# Patient Record
Sex: FEMALE | Race: OTHER | Employment: FULL TIME | URBAN - METROPOLITAN AREA
[De-identification: names, ages, dates, MRNs, and addresses within clinical notes are randomized per-mention and may not be internally consistent; named-entity substitution may affect disease eponyms.]

---

## 2018-11-28 ENCOUNTER — OFFICE VISIT (OUTPATIENT)
Dept: INTERNAL MEDICINE CLINIC | Facility: CLINIC | Age: 28
End: 2018-11-28
Payer: COMMERCIAL

## 2018-11-28 VITALS
BODY MASS INDEX: 20.89 KG/M2 | SYSTOLIC BLOOD PRESSURE: 126 MMHG | DIASTOLIC BLOOD PRESSURE: 78 MMHG | HEART RATE: 81 BPM | WEIGHT: 130 LBS | HEIGHT: 66 IN | OXYGEN SATURATION: 100 % | RESPIRATION RATE: 15 BRPM

## 2018-11-28 DIAGNOSIS — Z00.00 PHYSICAL EXAM: Primary | ICD-10-CM

## 2018-11-28 PROCEDURE — 99385 PREV VISIT NEW AGE 18-39: CPT | Performed by: FAMILY MEDICINE

## 2018-11-28 NOTE — PROGRESS NOTES
HPI:   Bonnie Khan is a 25year old female who presents for a complete physical exam.  Last pap:   Had 1/18 normal  With OB at her 6 wk pp check   Menses:  Regular   Contraception:  Condoms   Supplements:  no  Exercise:  No   Patient complains/concerns: without murmur  GI: good BS's,no masses, HSM or tenderness  EXTREMITIES: no edema    ASSESSMENT AND PLAN:   Noe Galaviz is a 25year old female who presents for a complete physical exam.  No diagnosis found.   · Pap:   ·   Self breast exam explained and

## 2018-11-29 ENCOUNTER — TELEPHONE (OUTPATIENT)
Dept: INTERNAL MEDICINE CLINIC | Facility: CLINIC | Age: 28
End: 2018-11-29

## 2018-12-04 ENCOUNTER — TELEPHONE (OUTPATIENT)
Dept: INTERNAL MEDICINE CLINIC | Facility: CLINIC | Age: 28
End: 2018-12-04

## 2018-12-04 NOTE — TELEPHONE ENCOUNTER
Medical release form was fax to [facility].  Teresa Adkins or Dr. Cathi Osgood 100   Ph. [phone] and sent to scan

## 2019-07-18 NOTE — TELEPHONE ENCOUNTER
Medical release form was fax to [facility] [address]. Beatriz Thomas or Dr. Maurice Hernandez    @ fax [phone].  **3rd Request**

## 2019-10-10 ENCOUNTER — OFFICE VISIT (OUTPATIENT)
Dept: FAMILY MEDICINE CLINIC | Facility: CLINIC | Age: 29
End: 2019-10-10
Payer: COMMERCIAL

## 2019-10-10 VITALS
RESPIRATION RATE: 17 BRPM | HEIGHT: 66 IN | WEIGHT: 161 LBS | OXYGEN SATURATION: 99 % | TEMPERATURE: 98 F | HEART RATE: 77 BPM | DIASTOLIC BLOOD PRESSURE: 70 MMHG | BODY MASS INDEX: 25.88 KG/M2 | SYSTOLIC BLOOD PRESSURE: 122 MMHG

## 2019-10-10 DIAGNOSIS — H61.23 BILATERAL IMPACTED CERUMEN: Primary | ICD-10-CM

## 2019-10-10 PROCEDURE — 69209 REMOVE IMPACTED EAR WAX UNI: CPT | Performed by: NURSE PRACTITIONER

## 2019-10-10 NOTE — PROGRESS NOTES
CHIEF COMPLAINT:   Patient presents with:  Ear Problem: trouble hearing from right ear x 2 wks       HPI:   Elan Glynn is a 30year old female who presents to clinic today with complaints of clogged ears. Patient requesting ear flush.  Patient report Bilateral impacted cerumen  (primary encounter diagnosis)    PLAN:  Successful cerumen removal. Patient tolerated well without complications.   Comfort measures/home care as described in Patient Instructions    Stop using q-tips    Discussed s/s of infectio Objects repeatedly placed in the ear can also cause impacted earwax. For example, putting cotton swabs in the ear may push the wax deeper into the ear. Over time, this may cause blockage.  Hearing aids, swimming plugs, and swim molds can cause the same prob Call your healthcare provider if you have symptoms of impacted earwax. Also call right away if you have severe symptoms after earwax removal. These may include bleeding or severe ear pain.   Date Last Reviewed: 5/1/2017  © 1951-6635 [employer] · The healthcare provider may recommend mineral oil or an over-the-counter eardrop to use at home to soften the earwax. Use these products only if the provider recommends them. Carefully follow the instructions given.   · Don’t use mineral oil or OTC eardro · Foul-smelling fluid draining from the ear  · Swelling, redness, or tenderness of the outer ear  · Headache, neck pain, or stiff neck  Date Last Reviewed: 6/1/2017  © 9921-0867 [employer] [address]. [address] [facility] [place], [address] [place] [address].  [facility]

## 2019-10-10 NOTE — PATIENT INSTRUCTIONS
Impacted Earwax     Inner ear structures including ear canal and eardrum. Impacted earwax is a buildup of the natural wax in the ear (cerumen). Impacted earwax is very common. It can cause symptoms such as hearing loss.  It can also make it difficult buildup.  Then it may cause symptoms such as:  · Hearing loss  · Earache  · Sense of ear fullness  · Itching in the ear  · Odor from the ear  · Ear drainage  · Dizziness  · Ringing in the ears  · Cough  Treatment for impacted earwax  If you don’t have sympt instructions. Earwax Removal    The ear canal makes earwax from the canal’s lining. The ears make wax to lubricate and protect the ear canal. The ear canal is the tube that connects the middle ear to the outside of the ear.  The wax protects the ear of the ear and around the opening to the ear canal.  · Don't use any probing device or object such as cotton-tipped swabs or leyda pins to clean the inside of your ears. · Don’t use ear candles to clean your ears. Candling can be dangerous.  It can burn th

## 2019-12-05 ENCOUNTER — OFFICE VISIT (OUTPATIENT)
Dept: INTERNAL MEDICINE CLINIC | Facility: CLINIC | Age: 29
End: 2019-12-05
Payer: COMMERCIAL

## 2019-12-05 VITALS
BODY MASS INDEX: 25.01 KG/M2 | HEIGHT: 66 IN | HEART RATE: 67 BPM | OXYGEN SATURATION: 100 % | DIASTOLIC BLOOD PRESSURE: 61 MMHG | WEIGHT: 155.63 LBS | SYSTOLIC BLOOD PRESSURE: 122 MMHG

## 2019-12-05 DIAGNOSIS — Z00.00 PHYSICAL EXAM: Primary | ICD-10-CM

## 2019-12-05 PROCEDURE — 99395 PREV VISIT EST AGE 18-39: CPT | Performed by: FAMILY MEDICINE

## 2019-12-05 NOTE — PROGRESS NOTES
HPI:   Pauline Jason is a 30year old female who presents for a complete physical exam.  Last pap: ?  Few yearsat Ob GYn  Menses:  Regular , q 32   Contraception:  Condoms   Supplements:  MVI  Exercise:  No     [facility]  LUNGS: clear to auscultation  CARDIO: RRR without murmur  GI: good BS's,no masses, HSM or tenderness  : normal external genitalia, normal appearing cervix, no adnexal masses.    On period  EXTREMITIES: no edema    ASSESSMENT AND PLAN:   Mckay Cellar

## 2020-12-09 ENCOUNTER — OFFICE VISIT (OUTPATIENT)
Dept: INTERNAL MEDICINE CLINIC | Facility: CLINIC | Age: 30
End: 2020-12-09
Payer: COMMERCIAL

## 2020-12-09 VITALS
BODY MASS INDEX: 25.94 KG/M2 | OXYGEN SATURATION: 98 % | SYSTOLIC BLOOD PRESSURE: 114 MMHG | HEIGHT: 66 IN | WEIGHT: 161.38 LBS | DIASTOLIC BLOOD PRESSURE: 64 MMHG | HEART RATE: 91 BPM

## 2020-12-09 DIAGNOSIS — Z00.00 PHYSICAL EXAM: Primary | ICD-10-CM

## 2020-12-09 PROCEDURE — 3074F SYST BP LT 130 MM HG: CPT | Performed by: FAMILY MEDICINE

## 2020-12-09 PROCEDURE — 99395 PREV VISIT EST AGE 18-39: CPT | Performed by: FAMILY MEDICINE

## 2020-12-09 PROCEDURE — 90686 IIV4 VACC NO PRSV 0.5 ML IM: CPT | Performed by: FAMILY MEDICINE

## 2020-12-09 PROCEDURE — 90471 IMMUNIZATION ADMIN: CPT | Performed by: FAMILY MEDICINE

## 2020-12-09 PROCEDURE — 3078F DIAST BP <80 MM HG: CPT | Performed by: FAMILY MEDICINE

## 2020-12-09 PROCEDURE — 3008F BODY MASS INDEX DOCD: CPT | Performed by: FAMILY MEDICINE

## 2020-12-09 NOTE — PROGRESS NOTES
HPI:   Porfirio Wheatley is a 26year old female who presents for a complete physical exam.  Last pap: UTD  Menses:  Regular , last one a little heavier than normal, 4-5/ days and come q 30   Contraception:  Condoms   Supplements:  MVI  Exercise:  Not regular supple,no adenopathy  BREAST: no dominant or suspicious mass, no nipple discharge  LUNGS: clear to auscultation  CARDIO: RRR without murmur  GI: good BS's,no masses, HSM or tenderness  EXTREMITIES: no edema    ASSESSMENT AND PLAN:   Guille Galicia is a 26

## 2021-02-06 ENCOUNTER — NURSE ONLY (OUTPATIENT)
Dept: INTERNAL MEDICINE CLINIC | Facility: CLINIC | Age: 31
End: 2021-02-06
Payer: COMMERCIAL

## 2021-02-06 PROCEDURE — 36415 COLL VENOUS BLD VENIPUNCTURE: CPT | Performed by: FAMILY MEDICINE

## 2021-02-07 LAB
ABSOLUTE BASOPHILS: 20 CELLS/UL (ref 0–200)
ABSOLUTE EOSINOPHILS: 20 CELLS/UL (ref 15–500)
ABSOLUTE LYMPHOCYTES: 1469 CELLS/UL (ref 850–3900)
ABSOLUTE MONOCYTES: 474 CELLS/UL (ref 200–950)
ABSOLUTE NEUTROPHILS: 3116 CELLS/UL (ref 1500–7800)
ALBUMIN/GLOBULIN RATIO: 1.4 (CALC) (ref 1–2.5)
ALBUMIN: 4.3 G/DL (ref 3.6–5.1)
ALKALINE PHOSPHATASE: 55 U/L (ref 31–125)
ALT: 8 U/L (ref 6–29)
AST: 13 U/L (ref 10–30)
BASOPHILS: 0.4 %
BILIRUBIN, TOTAL: 0.8 MG/DL (ref 0.2–1.2)
BUN: 11 MG/DL (ref 7–25)
CALCIUM: 9.5 MG/DL (ref 8.6–10.2)
CARBON DIOXIDE: 27 MMOL/L (ref 20–32)
CHLORIDE: 104 MMOL/L (ref 98–110)
CHOL/HDLC RATIO: 2.8 (CALC)
CHOLESTEROL, TOTAL: 173 MG/DL
CREATININE: 0.83 MG/DL (ref 0.5–1.1)
EGFR IF AFRICN AM: 107 ML/MIN/1.73M2
EGFR IF NONAFRICN AM: 93 ML/MIN/1.73M2
EOSINOPHILS: 0.4 %
GLOBULIN: 3.1 G/DL (CALC) (ref 1.9–3.7)
GLUCOSE: 89 MG/DL (ref 65–99)
HDL CHOLESTEROL: 62 MG/DL
HEMATOCRIT: 36.2 % (ref 35–45)
HEMOGLOBIN: 12.1 G/DL (ref 11.7–15.5)
LDL-CHOLESTEROL: 93 MG/DL (CALC)
LYMPHOCYTES: 28.8 %
MCH: 30.2 PG (ref 27–33)
MCHC: 33.4 G/DL (ref 32–36)
MCV: 90.3 FL (ref 80–100)
MONOCYTES: 9.3 %
MPV: 9.9 FL (ref 7.5–12.5)
NEUTROPHILS: 61.1 %
NON-HDL CHOLESTEROL: 111 MG/DL (CALC)
PLATELET COUNT: 273 THOUSAND/UL (ref 140–400)
POTASSIUM: 4.2 MMOL/L (ref 3.5–5.3)
PROTEIN, TOTAL: 7.4 G/DL (ref 6.1–8.1)
RDW: 11.5 % (ref 11–15)
RED BLOOD CELL COUNT: 4.01 MILLION/UL (ref 3.8–5.1)
SODIUM: 138 MMOL/L (ref 135–146)
TRIGLYCERIDES: 86 MG/DL
TSH W/REFLEX TO FT4: 1.89 MIU/L
WHITE BLOOD CELL COUNT: 5.1 THOUSAND/UL (ref 3.8–10.8)

## 2021-12-16 ENCOUNTER — OFFICE VISIT (OUTPATIENT)
Dept: INTERNAL MEDICINE CLINIC | Facility: CLINIC | Age: 31
End: 2021-12-16
Payer: COMMERCIAL

## 2021-12-16 VITALS
WEIGHT: 159.63 LBS | OXYGEN SATURATION: 100 % | BODY MASS INDEX: 25.66 KG/M2 | DIASTOLIC BLOOD PRESSURE: 66 MMHG | HEART RATE: 98 BPM | SYSTOLIC BLOOD PRESSURE: 128 MMHG | TEMPERATURE: 98 F | HEIGHT: 66 IN

## 2021-12-16 DIAGNOSIS — Z00.00 PHYSICAL EXAM: Primary | ICD-10-CM

## 2021-12-16 DIAGNOSIS — Z3A.14 14 WEEKS GESTATION OF PREGNANCY: ICD-10-CM

## 2021-12-16 PROCEDURE — 3008F BODY MASS INDEX DOCD: CPT | Performed by: FAMILY MEDICINE

## 2021-12-16 PROCEDURE — 90686 IIV4 VACC NO PRSV 0.5 ML IM: CPT | Performed by: FAMILY MEDICINE

## 2021-12-16 PROCEDURE — 90471 IMMUNIZATION ADMIN: CPT | Performed by: FAMILY MEDICINE

## 2021-12-16 PROCEDURE — 99395 PREV VISIT EST AGE 18-39: CPT | Performed by: FAMILY MEDICINE

## 2021-12-16 PROCEDURE — 3074F SYST BP LT 130 MM HG: CPT | Performed by: FAMILY MEDICINE

## 2021-12-16 PROCEDURE — 3078F DIAST BP <80 MM HG: CPT | Performed by: FAMILY MEDICINE

## 2021-12-16 NOTE — PROGRESS NOTES
HPI:   Mona Small is a 33year old female who presents for a complete physical exam.  Last pap: UTD  Pregnant 14 weeks.  Doing well now  Taking PNV  Not on any meds  Exercise:  Not regular , working from home      [facility]  LUNGS: clear to auscultation  CARDIO: RRR without murmur  GI: good BS's,no masses, HSM or tenderness  EXTREMITIES: no edema    ASSESSMENT AND PLAN:   Sami Melendez is a 33year old female who presents for a complete physical exam.  Ph

## 2023-12-06 ENCOUNTER — OFFICE VISIT (OUTPATIENT)
Dept: INTERNAL MEDICINE CLINIC | Facility: CLINIC | Age: 33
End: 2023-12-06
Payer: COMMERCIAL

## 2023-12-06 VITALS
HEART RATE: 84 BPM | HEIGHT: 66 IN | DIASTOLIC BLOOD PRESSURE: 72 MMHG | SYSTOLIC BLOOD PRESSURE: 130 MMHG | OXYGEN SATURATION: 100 % | WEIGHT: 166.19 LBS | BODY MASS INDEX: 26.71 KG/M2

## 2023-12-06 DIAGNOSIS — R59.1 LYMPHADENOPATHY: ICD-10-CM

## 2023-12-06 DIAGNOSIS — Z00.00 PHYSICAL EXAM: Primary | ICD-10-CM

## 2023-12-06 PROCEDURE — 99395 PREV VISIT EST AGE 18-39: CPT | Performed by: FAMILY MEDICINE

## 2023-12-06 PROCEDURE — 3078F DIAST BP <80 MM HG: CPT | Performed by: FAMILY MEDICINE

## 2023-12-06 PROCEDURE — 3075F SYST BP GE 130 - 139MM HG: CPT | Performed by: FAMILY MEDICINE

## 2023-12-06 PROCEDURE — 3008F BODY MASS INDEX DOCD: CPT | Performed by: FAMILY MEDICINE

## 2023-12-08 LAB — HPV MRNA E6/E7: NOT DETECTED

## 2023-12-12 ENCOUNTER — PATIENT MESSAGE (OUTPATIENT)
Dept: INTERNAL MEDICINE CLINIC | Facility: CLINIC | Age: 33
End: 2023-12-12

## 2023-12-12 NOTE — TELEPHONE ENCOUNTER
From: Janneth Cody  To: Bia Paula  Sent: 12/12/2023 10:23 AM CST  Subject: Ultrasound    Hi, just checking if it would be fine to get the ultrasound done at an imaging facility vs the hospital?    Thanks

## 2023-12-14 ENCOUNTER — TELEPHONE (OUTPATIENT)
Dept: INTERNAL MEDICINE CLINIC | Facility: CLINIC | Age: 33
End: 2023-12-14

## 2023-12-14 NOTE — TELEPHONE ENCOUNTER
Patient called stating that her  will  a copy of her order for ct scan. His name is Ramesh Jones.  Copy of order is in filing drawer

## 2023-12-17 LAB
ABSOLUTE BASOPHILS: 40 CELLS/UL (ref 0–200)
ABSOLUTE EOSINOPHILS: 20 CELLS/UL (ref 15–500)
ABSOLUTE LYMPHOCYTES: 1695 CELLS/UL (ref 850–3900)
ABSOLUTE MONOCYTES: 415 CELLS/UL (ref 200–950)
ABSOLUTE NEUTROPHILS: 2830 CELLS/UL (ref 1500–7800)
ALBUMIN/GLOBULIN RATIO: 1.5 (CALC) (ref 1–2.5)
ALBUMIN: 4.5 G/DL (ref 3.6–5.1)
ALKALINE PHOSPHATASE: 54 U/L (ref 31–125)
ALT: 7 U/L (ref 6–29)
AST: 13 U/L (ref 10–30)
BASOPHILS: 0.8 %
BILIRUBIN, TOTAL: 0.6 MG/DL (ref 0.2–1.2)
BUN: 14 MG/DL (ref 7–25)
CALCIUM: 9.4 MG/DL (ref 8.6–10.2)
CARBON DIOXIDE: 27 MMOL/L (ref 20–32)
CHLORIDE: 105 MMOL/L (ref 98–110)
CHOL/HDLC RATIO: 2.7 (CALC)
CHOLESTEROL, TOTAL: 170 MG/DL
CREATININE: 0.69 MG/DL (ref 0.5–0.97)
EGFR: 116 ML/MIN/1.73M2
EOSINOPHILS: 0.4 %
GLOBULIN: 3 G/DL (CALC) (ref 1.9–3.7)
GLUCOSE: 94 MG/DL (ref 65–99)
HDL CHOLESTEROL: 64 MG/DL
HEMATOCRIT: 38.1 % (ref 35–45)
HEMOGLOBIN: 12.5 G/DL (ref 11.7–15.5)
LDL-CHOLESTEROL: 92 MG/DL (CALC)
LYMPHOCYTES: 33.9 %
MCH: 30.2 PG (ref 27–33)
MCHC: 32.8 G/DL (ref 32–36)
MCV: 92 FL (ref 80–100)
MONOCYTES: 8.3 %
MPV: 9.9 FL (ref 7.5–12.5)
NEUTROPHILS: 56.6 %
NON-HDL CHOLESTEROL: 106 MG/DL (CALC)
PLATELET COUNT: 277 THOUSAND/UL (ref 140–400)
POTASSIUM: 4.6 MMOL/L (ref 3.5–5.3)
PROTEIN, TOTAL: 7.5 G/DL (ref 6.1–8.1)
RDW: 11.7 % (ref 11–15)
RED BLOOD CELL COUNT: 4.14 MILLION/UL (ref 3.8–5.1)
SODIUM: 139 MMOL/L (ref 135–146)
TRIGLYCERIDES: 56 MG/DL
TSH W/REFLEX TO FT4: 1.67 MIU/L
WHITE BLOOD CELL COUNT: 5 THOUSAND/UL (ref 3.8–10.8)

## 2024-01-17 ENCOUNTER — TELEPHONE (OUTPATIENT)
Dept: INTERNAL MEDICINE CLINIC | Facility: CLINIC | Age: 34
End: 2024-01-17

## 2024-01-17 DIAGNOSIS — E04.1 THYROID NODULE: Primary | ICD-10-CM

## 2024-01-17 DIAGNOSIS — R59.1 LYMPHADENOPATHY: ICD-10-CM

## 2024-01-17 NOTE — TELEPHONE ENCOUNTER
Patient regarding ultrasound of her neck results that was done at [facility].  Ultrasound shows some nonspecific possible reactive lymph nodes - largest 2.2 cm and recommend 3-month follow-up.  Also partially visualized thyroid nodules were seen.  Thyroid ultrasound recommended.    I discussed results with patient.  Thyroid ultrasound ordered with notes to please look at the lymph nodes as well for follow up.  Pt thinks LNs always on larger side. Has had some cough/ colds recently past 2 mos    If ever worrisome symptoms, night sweats/ weight changes- contact me

## 2024-01-18 ENCOUNTER — MED REC SCAN ONLY (OUTPATIENT)
Dept: INTERNAL MEDICINE CLINIC | Facility: CLINIC | Age: 34
End: 2024-01-18

## 2024-04-11 PROBLEM — E04.1 THYROID NODULE: Status: ACTIVE | Noted: 2024-04-11

## 2024-10-22 NOTE — PROGRESS NOTES
HPI:   Melba Watson is a 34 year old female who presents for a complete physical exam  PAp UTD  Periods- regular ,   Not on ocps    Exercise:  no dedicated , but active with kids      Thyroid nodule    Some breast pain before her period - always left side and outside breast area  No fam breast cancer    Mild anxiety     2 boys-  Admin for [employer]        Wt Readings from Last 6 Encounters:   10/24/24 170 lb 3.2 oz (77.2 kg)   12/06/23 166 lb 3.2 oz (75.4 kg)   12/16/21 159 lb 9.6 oz (72.4 kg)   12/09/20 161 lb 6.4 oz (73.2 kg)   12/05/19 155 lb 9.6 oz (70.6 kg)   10/10/19 161 lb (73 kg)     Body mass index is 27.47 kg/m².     CHOLESTEROL, TOTAL (mg/dL)   Date Value   12/16/2023 170   02/06/2021 173   12/08/2018 182     HDL CHOLESTEROL (mg/dL)   Date Value   12/16/2023 64   02/06/2021 62   12/08/2018 65     LDL-CHOLESTEROL (mg/dL (calc))   Date Value   12/16/2023 92   02/06/2021 93   12/08/2018 100 (H)        No current outpatient medications on file.      No past medical history on file.   No past surgical history on file.   Family History   Problem Relation Age of Onset    Lipids Father       Social History:   Social History     Socioeconomic History    Marital status:    Tobacco Use    Smoking status: Never    Smokeless tobacco: Never   Substance and Sexual Activity    Alcohol use: No     Social Drivers of Health     Food Insecurity: Low Risk  (5/31/2022)    Received from [facility]    Food Insecurity     Have there been times that your food ran out, and you didn't have money to get more?: No     Are there times that you worry that this might happen?: No   Transportation Needs: Low Risk  (5/31/2022)    Received from [facility]    Transportation Needs     Do you have trouble getting transportation to medical appointments?: No   Housing Stability: Low Risk  (5/31/2022)    Received  from [facility], [facility]    Housing Stability     Are you concerned about having a safe and reliable place to live?: No          REVIEW OF SYSTEMS:   GENERAL: feels well otherwise  No night sweats/ coughs/ fevers/ weight loss   States has always had enlarged lymph nodes for years that are unchanged   SKIN: denies any unusual skin lesions  LUNGS: denies shortness of breath  CARDIOVASCULAR: denies chest pain  GI: denies abdominal pain,  No constipation or diarrhea        EXAM:   /80   Pulse 94   Ht 5' 6\" (1.676 m)   Wt 170 lb 3.2 oz (77.2 kg)   SpO2 98%   BMI 27.47 kg/m²   Body mass index is 27.47 kg/m².   GENERAL: well developed, well nourished,in no apparent distress  SKIN: no rashes,no suspicious lesions  HEENT: atraumatic, normocephalic,  EYES:conjunctiva are clear  NECK: supple, small mobile cervical LNs , no supraclavicular or axillary notes . No posterior cervical LNS  No thyroid nodules appreciated   BREAST: no dominant or suspicious mass, no nipple discharge  LUNGS: clear to auscultation  CARDIO: RRR without murmur  GI: good BS's,no masses, HSM or tenderness    EXTREMITIES: no edema    ASSESSMENT AND PLAN:   Melba Watson is a 34 year old female who presents for a complete physical exam.  Encounter Diagnosis   Name Primary?    Physical exam Yes         Discussed with patient pap smear guidelines and the importance of screening for cervical cancer  Discussed the importance of yearly mammograms starting at age 40 to help detect breast cancer.   Self breast exam explained and encouraged to perform monthly.   Health maintenance labs, recommend yearly: Lipids, CMP, and CBC.   Discussed importance of screening for colon cancer with colonoscopies starting at age 45, or sooner if high risk  Recommended low fat diet, low carb diet and regular aerobic exercise.    The patient indicates understanding of these issues and agrees to the plan.  The patient is asked  to return for CPX in 1 yr.    1. Physical exam    - HGB A1C (Glycohemoglobin) [496] [Q]  - CBC With Differential With Platelet  - Comp Metabolic Panel (14)  - TSH W Reflex To Free T4  - Lipid Panel    2. Thyroid nodule  F/u 1 year   - US THYROID (CPT=76536); Future    3. Breast pain  No masses on exam but recommend diagnostic mammo   - MERRY ROZ 2D+3D DIAGNOSTIC MERRY  BILAT (CPT=77066/75849); Future            5. Cervical lymphadenopathy  Normal appearing LNS seen on last US  LNs get larger when sick   If ever increasing, painful, fevers, other symptoms follow up    - US THYROID (CPT=76536); Future      Orders Placed This Encounter   Procedures    HGB A1C (Glycohemoglobin) [496] [Q]    CBC With Differential With Platelet    Comp Metabolic Panel (14)    TSH W Reflex To Free T4    Lipid Panel       Meds & Refills for this Visit:  Requested Prescriptions      No prescriptions requested or ordered in this encounter       Imaging & Consults:  None

## 2024-10-24 ENCOUNTER — OFFICE VISIT (OUTPATIENT)
Dept: INTERNAL MEDICINE CLINIC | Facility: CLINIC | Age: 34
End: 2024-10-24
Payer: COMMERCIAL

## 2024-10-24 ENCOUNTER — TELEPHONE (OUTPATIENT)
Dept: INTERNAL MEDICINE CLINIC | Facility: CLINIC | Age: 34
End: 2024-10-24

## 2024-10-24 VITALS
BODY MASS INDEX: 27.35 KG/M2 | SYSTOLIC BLOOD PRESSURE: 138 MMHG | OXYGEN SATURATION: 98 % | HEART RATE: 94 BPM | HEIGHT: 66 IN | DIASTOLIC BLOOD PRESSURE: 70 MMHG | WEIGHT: 170.19 LBS

## 2024-10-24 DIAGNOSIS — E04.1 THYROID NODULE: ICD-10-CM

## 2024-10-24 DIAGNOSIS — R59.0 CERVICAL LYMPHADENOPATHY: ICD-10-CM

## 2024-10-24 DIAGNOSIS — N64.4 BREAST PAIN: ICD-10-CM

## 2024-10-24 DIAGNOSIS — Z00.00 PHYSICAL EXAM: Primary | ICD-10-CM

## 2024-10-24 PROCEDURE — 99395 PREV VISIT EST AGE 18-39: CPT | Performed by: FAMILY MEDICINE

## 2024-10-24 NOTE — TELEPHONE ENCOUNTER
Called patient and informed mammo orders are ready to be picked up.   Patient stated either her or her  will  mammo orders today or tomorrow.   EDGARD BOSWELL

## 2024-10-24 NOTE — TELEPHONE ENCOUNTER
Patient called the office as she forgot at her appointment today to please ask for an order for her mammogram, as she may be going to an outside facility for the test.    She said either she or her  will  the envelope today.

## 2024-10-25 LAB
ABSOLUTE BASOPHILS: 27 CELLS/UL (ref 0–200)
ABSOLUTE EOSINOPHILS: 20 CELLS/UL (ref 15–500)
ABSOLUTE LYMPHOCYTES: 1850 CELLS/UL (ref 850–3900)
ABSOLUTE MONOCYTES: 381 CELLS/UL (ref 200–950)
ABSOLUTE NEUTROPHILS: 4522 CELLS/UL (ref 1500–7800)
ALBUMIN/GLOBULIN RATIO: 1.4 (CALC) (ref 1–2.5)
ALBUMIN: 4.8 G/DL (ref 3.6–5.1)
ALKALINE PHOSPHATASE: 60 U/L (ref 31–125)
ALT: 8 U/L (ref 6–29)
AST: 17 U/L (ref 10–30)
BASOPHILS: 0.4 %
BILIRUBIN, TOTAL: 0.5 MG/DL (ref 0.2–1.2)
BUN: 12 MG/DL (ref 7–25)
CALCIUM: 10 MG/DL (ref 8.6–10.2)
CARBON DIOXIDE: 19 MMOL/L (ref 20–32)
CHLORIDE: 107 MMOL/L (ref 98–110)
CHOL/HDLC RATIO: 3 (CALC)
CHOLESTEROL, TOTAL: 194 MG/DL
CREATININE: 0.71 MG/DL (ref 0.5–0.97)
EGFR: 113 ML/MIN/1.73M2
EOSINOPHILS: 0.3 %
GLOBULIN: 3.5 G/DL (CALC) (ref 1.9–3.7)
GLUCOSE: 103 MG/DL (ref 65–99)
HDL CHOLESTEROL: 65 MG/DL
HEMATOCRIT: 40.7 % (ref 35–45)
HEMOGLOBIN A1C: 5.3 % OF TOTAL HGB
HEMOGLOBIN: 13.5 G/DL (ref 11.7–15.5)
LDL-CHOLESTEROL: 105 MG/DL (CALC)
LYMPHOCYTES: 27.2 %
MCH: 30.3 PG (ref 27–33)
MCHC: 33.2 G/DL (ref 32–36)
MCV: 91.3 FL (ref 80–100)
MONOCYTES: 5.6 %
MPV: 9.8 FL (ref 7.5–12.5)
NEUTROPHILS: 66.5 %
NON-HDL CHOLESTEROL: 129 MG/DL (CALC)
PLATELET COUNT: 320 THOUSAND/UL (ref 140–400)
POTASSIUM: 4.8 MMOL/L (ref 3.5–5.3)
PROTEIN, TOTAL: 8.3 G/DL (ref 6.1–8.1)
RDW: 11.4 % (ref 11–15)
RED BLOOD CELL COUNT: 4.46 MILLION/UL (ref 3.8–5.1)
SODIUM: 140 MMOL/L (ref 135–146)
TRIGLYCERIDES: 147 MG/DL
TSH W/REFLEX TO FT4: 3.14 MIU/L
WHITE BLOOD CELL COUNT: 6.8 THOUSAND/UL (ref 3.8–10.8)

## 2024-10-26 ENCOUNTER — TELEPHONE (OUTPATIENT)
Dept: INTERNAL MEDICINE CLINIC | Facility: CLINIC | Age: 34
End: 2024-10-26

## 2025-02-14 ENCOUNTER — MED REC SCAN ONLY (OUTPATIENT)
Dept: INTERNAL MEDICINE CLINIC | Facility: CLINIC | Age: 35
End: 2025-02-14

## 2025-07-06 ENCOUNTER — PATIENT MESSAGE (OUTPATIENT)
Dept: INTERNAL MEDICINE CLINIC | Facility: CLINIC | Age: 35
End: 2025-07-06

## 2025-07-09 ENCOUNTER — OFFICE VISIT (OUTPATIENT)
Dept: OTOLARYNGOLOGY | Facility: CLINIC | Age: 35
End: 2025-07-09
Payer: COMMERCIAL

## 2025-07-09 DIAGNOSIS — H90.11 CONDUCTIVE HEARING LOSS OF RIGHT EAR, UNSPECIFIED HEARING STATUS ON CONTRALATERAL SIDE: ICD-10-CM

## 2025-07-09 DIAGNOSIS — H61.21 IMPACTED CERUMEN, RIGHT EAR: Primary | ICD-10-CM

## 2025-07-09 PROCEDURE — 69210 REMOVE IMPACTED EAR WAX UNI: CPT | Performed by: STUDENT IN AN ORGANIZED HEALTH CARE EDUCATION/TRAINING PROGRAM

## 2025-07-09 PROCEDURE — 99202 OFFICE O/P NEW SF 15 MIN: CPT | Performed by: STUDENT IN AN ORGANIZED HEALTH CARE EDUCATION/TRAINING PROGRAM

## 2025-07-09 RX ORDER — CIPROFLOXACIN AND DEXAMETHASONE 3; 1 MG/ML; MG/ML
4 SUSPENSION/ DROPS AURICULAR (OTIC) 2 TIMES DAILY
COMMUNITY
Start: 2025-07-06 | End: 2025-07-11

## 2025-07-09 NOTE — PROGRESS NOTES
Melba Watson is a 35 year old female.   Chief Complaint   Patient presents with    Ear Problem     Bilateral impacted cerumen     HPI:   35-year-old presents with her right ear being clogged    Current Medications[1]   Past Medical History[2]   Social History:  Short Social Hx on File[3]   Past Surgical History[4]      EXAM:   There were no vitals taken for this visit.    System Details   Skin Inspection - Normal.   Constitutional Overall appearance - Normal.   Head/Face Symmetric, TMJ tenderness not present    Eyes EOMI, PERRL   Right ear:  Canal with excessive cerumen, TM intact, no JUAN   Left ear:  Canal clear, TM intact, no JUAN   Nose: Septum midline, inferior turbinates not enlarged, nasal valves without collapse    Oral cavity/Oropharynx: No lesions or masses on inspection or palpation, tonsils symmetric    Neck: Soft without LAD, thyroid not enlarged  Voice clear/ no stridor   Other:      SCOPES AND PROCEDURES:     Canals:  Right: Canal reveals cerumen impaction,         Tympanic Membranes:  Right: Normal tympanic membrane.         TM Visualized Method:   Right TM examined via otomicroscopy.          PROCEDURE:    Removal of cerumen impaction   The cerumen impaction was completely removed using microscopy.   Removal was completed by using acurette and/or suction.   Comments: Return to clinic as needed.  Avoid q-tips, water precautions and use over the counter wax remedies as needed.    AUDIOGRAM AND IMAGING:         IMPRESSION:   1. Impacted cerumen, right ear    2. Conductive hearing loss of right ear, unspecified hearing status on contralateral side       Recommendations:  - Right ear cleaned of cerumen otherwise no abnormalities noted    The patient indicates understanding of these issues and agrees to the plan.      Nba Zuniga MD  7/9/2025  4:19 PM       [1]   Current Outpatient Medications   Medication Sig Dispense Refill    ciprofloxacin-dexamethasone 0.3-0.1 % Otic Suspension Place 4 drops into  both ears 2 (two) times daily.     [2] History reviewed. No pertinent past medical history.  [3]   Social History  Socioeconomic History    Marital status:    Tobacco Use    Smoking status: Never    Smokeless tobacco: Never   Vaping Use    Vaping status: Never Used   Substance and Sexual Activity    Alcohol use: No     Social Drivers of Health     Food Insecurity: Low Risk  (2022)    Received from [facility]    Food Insecurity     Have there been times that your food ran out, and you didn't have money to get more?: No     Are there times that you worry that this might happen?: No   Transportation Needs: Low Risk  (2022)    Received from [facility]    Transportation Needs     Do you have trouble getting transportation to medical appointments?: No   Housing Stability: Low Risk  (2022)    Received from [facility]    Housing Stability     Are you concerned about having a safe and reliable place to live?: No   [4]   Past Surgical History:  Procedure Laterality Date      2017